# Patient Record
Sex: FEMALE | Race: WHITE | NOT HISPANIC OR LATINO | URBAN - METROPOLITAN AREA
[De-identification: names, ages, dates, MRNs, and addresses within clinical notes are randomized per-mention and may not be internally consistent; named-entity substitution may affect disease eponyms.]

---

## 2020-03-14 ENCOUNTER — APPOINTMENT (OUTPATIENT)
Dept: RADIOLOGY | Facility: MEDICAL CENTER | Age: 60
End: 2020-03-14
Attending: EMERGENCY MEDICINE
Payer: MEDICAID

## 2020-03-14 ENCOUNTER — HOSPITAL ENCOUNTER (OUTPATIENT)
Facility: MEDICAL CENTER | Age: 60
End: 2020-03-15
Attending: EMERGENCY MEDICINE | Admitting: HOSPITALIST
Payer: MEDICAID

## 2020-03-14 DIAGNOSIS — R40.4 ALTERED LEVEL OF CONSCIOUSNESS: ICD-10-CM

## 2020-03-14 PROBLEM — E03.9 ACQUIRED HYPOTHYROIDISM: Status: ACTIVE | Noted: 2019-04-25

## 2020-03-14 LAB
ANION GAP SERPL CALC-SCNC: 9 MMOL/L (ref 7–16)
BASOPHILS # BLD AUTO: 0.3 % (ref 0–1.8)
BASOPHILS # BLD: 0.03 K/UL (ref 0–0.12)
BUN SERPL-MCNC: 30 MG/DL (ref 8–22)
CALCIUM SERPL-MCNC: 9.8 MG/DL (ref 8.5–10.5)
CHLORIDE SERPL-SCNC: 105 MMOL/L (ref 96–112)
CO2 SERPL-SCNC: 25 MMOL/L (ref 20–33)
CREAT SERPL-MCNC: 1.84 MG/DL (ref 0.5–1.4)
EKG IMPRESSION: NORMAL
EOSINOPHIL # BLD AUTO: 0.53 K/UL (ref 0–0.51)
EOSINOPHIL NFR BLD: 5.3 % (ref 0–6.9)
ERYTHROCYTE [DISTWIDTH] IN BLOOD BY AUTOMATED COUNT: 43.3 FL (ref 35.9–50)
GLUCOSE BLD-MCNC: 135 MG/DL (ref 65–99)
GLUCOSE BLD-MCNC: 57 MG/DL (ref 65–99)
GLUCOSE SERPL-MCNC: 59 MG/DL (ref 65–99)
HCT VFR BLD AUTO: 34.9 % (ref 37–47)
HGB BLD-MCNC: 10.8 G/DL (ref 12–16)
IMM GRANULOCYTES # BLD AUTO: 0.05 K/UL (ref 0–0.11)
IMM GRANULOCYTES NFR BLD AUTO: 0.5 % (ref 0–0.9)
LYMPHOCYTES # BLD AUTO: 3.72 K/UL (ref 1–4.8)
LYMPHOCYTES NFR BLD: 36.9 % (ref 22–41)
MCH RBC QN AUTO: 26.3 PG (ref 27–33)
MCHC RBC AUTO-ENTMCNC: 30.9 G/DL (ref 33.6–35)
MCV RBC AUTO: 84.9 FL (ref 81.4–97.8)
MONOCYTES # BLD AUTO: 0.65 K/UL (ref 0–0.85)
MONOCYTES NFR BLD AUTO: 6.4 % (ref 0–13.4)
NEUTROPHILS # BLD AUTO: 5.11 K/UL (ref 2–7.15)
NEUTROPHILS NFR BLD: 50.6 % (ref 44–72)
NRBC # BLD AUTO: 0 K/UL
NRBC BLD-RTO: 0 /100 WBC
PLATELET # BLD AUTO: 510 K/UL (ref 164–446)
PMV BLD AUTO: 10 FL (ref 9–12.9)
POTASSIUM SERPL-SCNC: 4.9 MMOL/L (ref 3.6–5.5)
RBC # BLD AUTO: 4.11 M/UL (ref 4.2–5.4)
SODIUM SERPL-SCNC: 139 MMOL/L (ref 135–145)
WBC # BLD AUTO: 10.1 K/UL (ref 4.8–10.8)

## 2020-03-14 PROCEDURE — 70496 CT ANGIOGRAPHY HEAD: CPT

## 2020-03-14 PROCEDURE — 96374 THER/PROPH/DIAG INJ IV PUSH: CPT | Mod: XU

## 2020-03-14 PROCEDURE — 99220 PR INITIAL OBSERVATION CARE,LEVL III: CPT | Performed by: HOSPITALIST

## 2020-03-14 PROCEDURE — 0042T CT-CEREBRAL PERFUSION ANALYSIS: CPT

## 2020-03-14 PROCEDURE — 96375 TX/PRO/DX INJ NEW DRUG ADDON: CPT

## 2020-03-14 PROCEDURE — G0378 HOSPITAL OBSERVATION PER HR: HCPCS

## 2020-03-14 PROCEDURE — 83036 HEMOGLOBIN GLYCOSYLATED A1C: CPT

## 2020-03-14 PROCEDURE — 700111 HCHG RX REV CODE 636 W/ 250 OVERRIDE (IP): Performed by: EMERGENCY MEDICINE

## 2020-03-14 PROCEDURE — 70498 CT ANGIOGRAPHY NECK: CPT

## 2020-03-14 PROCEDURE — 85025 COMPLETE CBC W/AUTO DIFF WBC: CPT

## 2020-03-14 PROCEDURE — 70450 CT HEAD/BRAIN W/O DYE: CPT

## 2020-03-14 PROCEDURE — 82962 GLUCOSE BLOOD TEST: CPT

## 2020-03-14 PROCEDURE — 700117 HCHG RX CONTRAST REV CODE 255: Performed by: EMERGENCY MEDICINE

## 2020-03-14 PROCEDURE — 93005 ELECTROCARDIOGRAM TRACING: CPT | Performed by: EMERGENCY MEDICINE

## 2020-03-14 PROCEDURE — 700105 HCHG RX REV CODE 258: Performed by: EMERGENCY MEDICINE

## 2020-03-14 PROCEDURE — 99285 EMERGENCY DEPT VISIT HI MDM: CPT

## 2020-03-14 PROCEDURE — 80048 BASIC METABOLIC PNL TOTAL CA: CPT

## 2020-03-14 RX ORDER — ALBUTEROL SULFATE 90 UG/1
2 AEROSOL, METERED RESPIRATORY (INHALATION) EVERY 6 HOURS PRN
COMMUNITY
Start: 2009-08-18

## 2020-03-14 RX ORDER — DEXTROSE AND SODIUM CHLORIDE 5; .45 G/100ML; G/100ML
INJECTION, SOLUTION INTRAVENOUS CONTINUOUS
Status: DISCONTINUED | OUTPATIENT
Start: 2020-03-14 | End: 2020-03-15

## 2020-03-14 RX ORDER — LOSARTAN POTASSIUM 50 MG/1
50 TABLET ORAL DAILY
COMMUNITY
Start: 2019-06-27

## 2020-03-14 RX ORDER — CYCLOBENZAPRINE HCL 10 MG
10 TABLET ORAL
COMMUNITY
Start: 2017-11-10

## 2020-03-14 RX ORDER — AMOXICILLIN 250 MG
2 CAPSULE ORAL 2 TIMES DAILY
Status: DISCONTINUED | OUTPATIENT
Start: 2020-03-14 | End: 2020-03-15 | Stop reason: HOSPADM

## 2020-03-14 RX ORDER — POLYETHYLENE GLYCOL 3350 17 G/17G
1 POWDER, FOR SOLUTION ORAL
Status: DISCONTINUED | OUTPATIENT
Start: 2020-03-14 | End: 2020-03-15 | Stop reason: HOSPADM

## 2020-03-14 RX ORDER — ATORVASTATIN CALCIUM 80 MG/1
80 TABLET, FILM COATED ORAL EVERY EVENING
Status: DISCONTINUED | OUTPATIENT
Start: 2020-03-14 | End: 2020-03-15 | Stop reason: HOSPADM

## 2020-03-14 RX ORDER — INSULIN GLARGINE 100 [IU]/ML
22 INJECTION, SOLUTION SUBCUTANEOUS EVERY EVENING
COMMUNITY
End: 2020-03-14

## 2020-03-14 RX ORDER — PROMETHAZINE HYDROCHLORIDE 25 MG/1
12.5-25 SUPPOSITORY RECTAL EVERY 4 HOURS PRN
Status: DISCONTINUED | OUTPATIENT
Start: 2020-03-14 | End: 2020-03-15 | Stop reason: HOSPADM

## 2020-03-14 RX ORDER — LEVOTHYROXINE SODIUM 0.15 MG/1
150 TABLET ORAL DAILY
COMMUNITY
Start: 2019-09-25

## 2020-03-14 RX ORDER — LEVOTHYROXINE SODIUM 0.15 MG/1
150 TABLET ORAL DAILY
Status: DISCONTINUED | OUTPATIENT
Start: 2020-03-15 | End: 2020-03-15 | Stop reason: HOSPADM

## 2020-03-14 RX ORDER — BISACODYL 10 MG
10 SUPPOSITORY, RECTAL RECTAL
Status: DISCONTINUED | OUTPATIENT
Start: 2020-03-14 | End: 2020-03-15 | Stop reason: HOSPADM

## 2020-03-14 RX ORDER — SODIUM CHLORIDE 9 MG/ML
1000 INJECTION, SOLUTION INTRAVENOUS ONCE
Status: COMPLETED | OUTPATIENT
Start: 2020-03-14 | End: 2020-03-15

## 2020-03-14 RX ORDER — PROMETHAZINE HYDROCHLORIDE 25 MG/1
12.5-25 TABLET ORAL EVERY 4 HOURS PRN
Status: DISCONTINUED | OUTPATIENT
Start: 2020-03-14 | End: 2020-03-15 | Stop reason: HOSPADM

## 2020-03-14 RX ORDER — ASPIRIN 300 MG/1
300 SUPPOSITORY RECTAL DAILY
Status: DISCONTINUED | OUTPATIENT
Start: 2020-03-15 | End: 2020-03-15 | Stop reason: HOSPADM

## 2020-03-14 RX ORDER — ASPIRIN 81 MG/1
324 TABLET, CHEWABLE ORAL DAILY
Status: DISCONTINUED | OUTPATIENT
Start: 2020-03-15 | End: 2020-03-15 | Stop reason: HOSPADM

## 2020-03-14 RX ORDER — ASPIRIN 325 MG
325 TABLET ORAL DAILY
Status: DISCONTINUED | OUTPATIENT
Start: 2020-03-15 | End: 2020-03-15 | Stop reason: HOSPADM

## 2020-03-14 RX ORDER — INSULIN ASPART 100 [IU]/ML
10-15 INJECTION, SOLUTION INTRAVENOUS; SUBCUTANEOUS 3 TIMES DAILY
COMMUNITY
Start: 2017-10-16

## 2020-03-14 RX ORDER — INSULIN GLARGINE 100 [IU]/ML
22 INJECTION, SOLUTION SUBCUTANEOUS
COMMUNITY
Start: 2013-04-05

## 2020-03-14 RX ORDER — ATORVASTATIN CALCIUM 40 MG/1
40 TABLET, FILM COATED ORAL DAILY
Status: ON HOLD | COMMUNITY
Start: 2018-08-30 | End: 2020-03-15

## 2020-03-14 RX ORDER — IBUPROFEN 200 MG
400-800 TABLET ORAL 4 TIMES DAILY
COMMUNITY
Start: 2010-05-03

## 2020-03-14 RX ORDER — GLUCAGON HYDROCHLORIDE 1 MG
1 KIT INJECTION PRN
COMMUNITY
Start: 2019-09-17

## 2020-03-14 RX ORDER — ASPIRIN 81 MG/1
81 TABLET, CHEWABLE ORAL DAILY
COMMUNITY
Start: 2019-07-24 | End: 2020-03-14

## 2020-03-14 RX ORDER — NITROGLYCERIN 0.4 MG/1
0.4 TABLET SUBLINGUAL PRN
COMMUNITY
Start: 2019-07-24

## 2020-03-14 RX ORDER — LISINOPRIL 5 MG/1
5 TABLET ORAL NIGHTLY
COMMUNITY
Start: 2018-08-30 | End: 2020-03-14

## 2020-03-14 RX ORDER — LORAZEPAM 2 MG/ML
1 INJECTION INTRAMUSCULAR ONCE
Status: COMPLETED | OUTPATIENT
Start: 2020-03-14 | End: 2020-03-14

## 2020-03-14 RX ORDER — METOPROLOL SUCCINATE 50 MG/1
25 TABLET, EXTENDED RELEASE ORAL DAILY
COMMUNITY
Start: 2019-07-24

## 2020-03-14 RX ORDER — LABETALOL HYDROCHLORIDE 5 MG/ML
10 INJECTION, SOLUTION INTRAVENOUS EVERY 4 HOURS PRN
Status: DISCONTINUED | OUTPATIENT
Start: 2020-03-14 | End: 2020-03-15 | Stop reason: HOSPADM

## 2020-03-14 RX ORDER — ONDANSETRON 4 MG/1
4 TABLET, ORALLY DISINTEGRATING ORAL EVERY 4 HOURS PRN
Status: DISCONTINUED | OUTPATIENT
Start: 2020-03-14 | End: 2020-03-15 | Stop reason: HOSPADM

## 2020-03-14 RX ORDER — GABAPENTIN 300 MG/1
300-600 CAPSULE ORAL
COMMUNITY
Start: 2017-10-16

## 2020-03-14 RX ORDER — ONDANSETRON 2 MG/ML
4 INJECTION INTRAMUSCULAR; INTRAVENOUS EVERY 4 HOURS PRN
Status: DISCONTINUED | OUTPATIENT
Start: 2020-03-14 | End: 2020-03-15 | Stop reason: HOSPADM

## 2020-03-14 RX ORDER — HYDRALAZINE HYDROCHLORIDE 20 MG/ML
10 INJECTION INTRAMUSCULAR; INTRAVENOUS
Status: DISCONTINUED | OUTPATIENT
Start: 2020-03-14 | End: 2020-03-15 | Stop reason: HOSPADM

## 2020-03-14 RX ORDER — PROCHLORPERAZINE EDISYLATE 5 MG/ML
5-10 INJECTION INTRAMUSCULAR; INTRAVENOUS EVERY 4 HOURS PRN
Status: DISCONTINUED | OUTPATIENT
Start: 2020-03-14 | End: 2020-03-15 | Stop reason: HOSPADM

## 2020-03-14 RX ORDER — GABAPENTIN 300 MG/1
600 CAPSULE ORAL 3 TIMES DAILY
Status: DISCONTINUED | OUTPATIENT
Start: 2020-03-15 | End: 2020-03-15 | Stop reason: HOSPADM

## 2020-03-14 RX ADMIN — IOHEXOL 80 ML: 350 INJECTION, SOLUTION INTRAVENOUS at 21:14

## 2020-03-14 RX ADMIN — SODIUM CHLORIDE 1000 ML: 9 INJECTION, SOLUTION INTRAVENOUS at 21:50

## 2020-03-14 RX ADMIN — SODIUM CHLORIDE 500 MG: 9 INJECTION, SOLUTION INTRAVENOUS at 22:47

## 2020-03-14 RX ADMIN — LORAZEPAM 1 MG: 2 INJECTION INTRAMUSCULAR; INTRAVENOUS at 20:38

## 2020-03-14 RX ADMIN — IOHEXOL 40 ML: 350 INJECTION, SOLUTION INTRAVENOUS at 21:03

## 2020-03-14 ASSESSMENT — ENCOUNTER SYMPTOMS
SPEECH CHANGE: 0
WEAKNESS: 1
FLANK PAIN: 0
ABDOMINAL PAIN: 0
DOUBLE VISION: 0
BLURRED VISION: 1
HEARTBURN: 0
FEVER: 0
SENSORY CHANGE: 1
NAUSEA: 0
DEPRESSION: 0
SHORTNESS OF BREATH: 0
PALPITATIONS: 0
BRUISES/BLEEDS EASILY: 0
MYALGIAS: 0
HEMOPTYSIS: 0
HEADACHES: 1
FOCAL WEAKNESS: 0
HALLUCINATIONS: 0
VOMITING: 0
DIZZINESS: 0
EYE DISCHARGE: 0
COUGH: 0
CHILLS: 0

## 2020-03-14 ASSESSMENT — LIFESTYLE VARIABLES: SUBSTANCE_ABUSE: 0

## 2020-03-15 ENCOUNTER — APPOINTMENT (OUTPATIENT)
Dept: RADIOLOGY | Facility: MEDICAL CENTER | Age: 60
End: 2020-03-15
Attending: HOSPITALIST
Payer: MEDICAID

## 2020-03-15 VITALS
TEMPERATURE: 98.7 F | RESPIRATION RATE: 18 BRPM | WEIGHT: 160 LBS | DIASTOLIC BLOOD PRESSURE: 74 MMHG | BODY MASS INDEX: 28.35 KG/M2 | OXYGEN SATURATION: 98 % | HEIGHT: 63 IN | SYSTOLIC BLOOD PRESSURE: 170 MMHG | HEART RATE: 101 BPM

## 2020-03-15 DIAGNOSIS — I63.81 CEREBROVASCULAR ACCIDENT (CVA) DUE TO OCCLUSION OF SMALL ARTERY (HCC): ICD-10-CM

## 2020-03-15 PROBLEM — R40.4 ALTERED LEVEL OF CONSCIOUSNESS: Status: RESOLVED | Noted: 2020-03-14 | Resolved: 2020-03-15

## 2020-03-15 PROBLEM — I63.9 ACUTE CVA (CEREBROVASCULAR ACCIDENT) (HCC): Status: ACTIVE | Noted: 2020-03-15

## 2020-03-15 LAB
ALBUMIN SERPL BCP-MCNC: 3.4 G/DL (ref 3.2–4.9)
ALBUMIN/GLOB SERPL: 1.1 G/DL
ALP SERPL-CCNC: 86 U/L (ref 30–99)
ALT SERPL-CCNC: 8 U/L (ref 2–50)
ANION GAP SERPL CALC-SCNC: 6 MMOL/L (ref 7–16)
AST SERPL-CCNC: 17 U/L (ref 12–45)
BASOPHILS # BLD AUTO: 0.4 % (ref 0–1.8)
BASOPHILS # BLD: 0.04 K/UL (ref 0–0.12)
BILIRUB SERPL-MCNC: 0.2 MG/DL (ref 0.1–1.5)
BUN SERPL-MCNC: 27 MG/DL (ref 8–22)
CALCIUM SERPL-MCNC: 8.7 MG/DL (ref 8.5–10.5)
CHLORIDE SERPL-SCNC: 104 MMOL/L (ref 96–112)
CHOLEST SERPL-MCNC: 166 MG/DL (ref 100–199)
CO2 SERPL-SCNC: 23 MMOL/L (ref 20–33)
CREAT SERPL-MCNC: 1.78 MG/DL (ref 0.5–1.4)
EOSINOPHIL # BLD AUTO: 0.3 K/UL (ref 0–0.51)
EOSINOPHIL NFR BLD: 3.2 % (ref 0–6.9)
ERYTHROCYTE [DISTWIDTH] IN BLOOD BY AUTOMATED COUNT: 44 FL (ref 35.9–50)
EST. AVERAGE GLUCOSE BLD GHB EST-MCNC: 214 MG/DL
GLOBULIN SER CALC-MCNC: 3.1 G/DL (ref 1.9–3.5)
GLUCOSE BLD-MCNC: 273 MG/DL (ref 65–99)
GLUCOSE BLD-MCNC: 355 MG/DL (ref 65–99)
GLUCOSE SERPL-MCNC: 421 MG/DL (ref 65–99)
HBA1C MFR BLD: 9.1 % (ref 0–5.6)
HCT VFR BLD AUTO: 32.5 % (ref 37–47)
HDLC SERPL-MCNC: 45 MG/DL
HGB BLD-MCNC: 10.3 G/DL (ref 12–16)
IMM GRANULOCYTES # BLD AUTO: 0.02 K/UL (ref 0–0.11)
IMM GRANULOCYTES NFR BLD AUTO: 0.2 % (ref 0–0.9)
LDLC SERPL CALC-MCNC: 75 MG/DL
LYMPHOCYTES # BLD AUTO: 2.84 K/UL (ref 1–4.8)
LYMPHOCYTES NFR BLD: 30.3 % (ref 22–41)
MCH RBC QN AUTO: 27.2 PG (ref 27–33)
MCHC RBC AUTO-ENTMCNC: 31.7 G/DL (ref 33.6–35)
MCV RBC AUTO: 85.8 FL (ref 81.4–97.8)
MONOCYTES # BLD AUTO: 0.5 K/UL (ref 0–0.85)
MONOCYTES NFR BLD AUTO: 5.3 % (ref 0–13.4)
NEUTROPHILS # BLD AUTO: 5.68 K/UL (ref 2–7.15)
NEUTROPHILS NFR BLD: 60.6 % (ref 44–72)
NRBC # BLD AUTO: 0 K/UL
NRBC BLD-RTO: 0 /100 WBC
PLATELET # BLD AUTO: 482 K/UL (ref 164–446)
PMV BLD AUTO: 10.8 FL (ref 9–12.9)
POTASSIUM SERPL-SCNC: 5.3 MMOL/L (ref 3.6–5.5)
PROT SERPL-MCNC: 6.5 G/DL (ref 6–8.2)
RBC # BLD AUTO: 3.79 M/UL (ref 4.2–5.4)
SODIUM SERPL-SCNC: 133 MMOL/L (ref 135–145)
TRIGL SERPL-MCNC: 231 MG/DL (ref 0–149)
WBC # BLD AUTO: 9.4 K/UL (ref 4.8–10.8)

## 2020-03-15 PROCEDURE — 700102 HCHG RX REV CODE 250 W/ 637 OVERRIDE(OP): Performed by: HOSPITALIST

## 2020-03-15 PROCEDURE — 96372 THER/PROPH/DIAG INJ SC/IM: CPT

## 2020-03-15 PROCEDURE — 82962 GLUCOSE BLOOD TEST: CPT | Mod: 91

## 2020-03-15 PROCEDURE — 85025 COMPLETE CBC W/AUTO DIFF WBC: CPT

## 2020-03-15 PROCEDURE — 99245 OFF/OP CONSLTJ NEW/EST HI 55: CPT | Performed by: PSYCHIATRY & NEUROLOGY

## 2020-03-15 PROCEDURE — 70551 MRI BRAIN STEM W/O DYE: CPT

## 2020-03-15 PROCEDURE — 80053 COMPREHEN METABOLIC PANEL: CPT

## 2020-03-15 PROCEDURE — 36415 COLL VENOUS BLD VENIPUNCTURE: CPT

## 2020-03-15 PROCEDURE — 80061 LIPID PANEL: CPT

## 2020-03-15 PROCEDURE — G0378 HOSPITAL OBSERVATION PER HR: HCPCS

## 2020-03-15 PROCEDURE — 700105 HCHG RX REV CODE 258: Performed by: HOSPITALIST

## 2020-03-15 PROCEDURE — A9270 NON-COVERED ITEM OR SERVICE: HCPCS | Performed by: HOSPITALIST

## 2020-03-15 PROCEDURE — 99217 PR OBSERVATION CARE DISCHARGE: CPT | Mod: GC | Performed by: INTERNAL MEDICINE

## 2020-03-15 RX ORDER — METOPROLOL SUCCINATE 25 MG/1
25 TABLET, EXTENDED RELEASE ORAL DAILY
Status: DISCONTINUED | OUTPATIENT
Start: 2020-03-15 | End: 2020-03-15 | Stop reason: HOSPADM

## 2020-03-15 RX ORDER — ATORVASTATIN CALCIUM 80 MG/1
80 TABLET, FILM COATED ORAL EVERY EVENING
Qty: 30 TAB | Refills: 0 | Status: SHIPPED | OUTPATIENT
Start: 2020-03-15

## 2020-03-15 RX ORDER — ASPIRIN 325 MG
325 TABLET ORAL DAILY
Qty: 30 TAB | Refills: 0 | Status: SHIPPED | OUTPATIENT
Start: 2020-03-16

## 2020-03-15 RX ADMIN — GABAPENTIN 600 MG: 300 CAPSULE ORAL at 08:54

## 2020-03-15 RX ADMIN — INSULIN HUMAN 3 UNITS: 100 INJECTION, SOLUTION PARENTERAL at 09:02

## 2020-03-15 RX ADMIN — ATORVASTATIN CALCIUM 80 MG: 80 TABLET, FILM COATED ORAL at 01:27

## 2020-03-15 RX ADMIN — INSULIN HUMAN 5 UNITS: 100 INJECTION, SOLUTION PARENTERAL at 03:19

## 2020-03-15 RX ADMIN — DEXTROSE AND SODIUM CHLORIDE: 5; 450 INJECTION, SOLUTION INTRAVENOUS at 00:23

## 2020-03-15 RX ADMIN — ASPIRIN 325 MG: 325 TABLET, FILM COATED ORAL at 08:54

## 2020-03-15 RX ADMIN — LEVOTHYROXINE SODIUM 150 MCG: 150 TABLET ORAL at 08:54

## 2020-03-15 ASSESSMENT — LIFESTYLE VARIABLES
HOW MANY TIMES IN THE PAST YEAR HAVE YOU HAD 5 OR MORE DRINKS IN A DAY: 0
AVERAGE NUMBER OF DAYS PER WEEK YOU HAVE A DRINK CONTAINING ALCOHOL: 0
CONSUMPTION TOTAL: NEGATIVE
HAVE YOU EVER FELT YOU SHOULD CUT DOWN ON YOUR DRINKING: NO
ALCOHOL_USE: NO
HAVE PEOPLE ANNOYED YOU BY CRITICIZING YOUR DRINKING: NO
ON A TYPICAL DAY WHEN YOU DRINK ALCOHOL HOW MANY DRINKS DO YOU HAVE: 0
EVER_SMOKED: NEVER
TOTAL SCORE: 0
TOTAL SCORE: 0
EVER FELT BAD OR GUILTY ABOUT YOUR DRINKING: NO
EVER HAD A DRINK FIRST THING IN THE MORNING TO STEADY YOUR NERVES TO GET RID OF A HANGOVER: NO
TOTAL SCORE: 0

## 2020-03-15 NOTE — PROGRESS NOTES
Patient back from MRI. Patient completed transfer from [facility] to bed fully independent and with full use of upper and lower limb extremities.

## 2020-03-15 NOTE — PROGRESS NOTES
Labs drawn and sent to lab. fsbs checked per pt demand, pt adamant , getting agitated and started to yell at staff, fs check fs 355, wants her insulin at this time.

## 2020-03-15 NOTE — PROGRESS NOTES
IV removed. Discharge instructions provided and patient verbalizes understanding. Patient states that all question have been answered. Copy of discharge provided to patient and signed. Prescription: script provided. Patient states that all personal items are in possess. Patient escorted off unit by wheelchair.  Follow-up appointments discussed with patient.

## 2020-03-15 NOTE — CARE PLAN
Problem: Safety:  Goal: Will remain free from injury  Outcome: PROGRESSING AS EXPECTED  Goal: Risk of aspiration will decrease  Outcome: PROGRESSING AS EXPECTED     Problem: Communication  Goal: The ability to communicate needs accurately and effectively will improve  Outcome: PROGRESSING AS EXPECTED

## 2020-03-15 NOTE — ED NOTES
Report to KIMBERLEY Huerta, CDU.     Pt to floor with transport.       Patients Father is in room 208 at the [facility]

## 2020-03-15 NOTE — ED NOTES
RN attempted to ambulate patient to bathroom. She was having difficulty with balance. She was leaning to right and had difficulty maintaining an upright position. She is equally weak on both sides but is have some ataxia. Dr. Quick notified.

## 2020-03-15 NOTE — PROGRESS NOTES
Pt refusing to take her am meds at this time, want RN to leave it at bedside so she can take when she is ready, pt informed  It is not allowed to leave meds at bedside.

## 2020-03-15 NOTE — H&P
Hospital Medicine History & Physical Note    Date of Service  3/14/2020    Primary Care Physician  No primary care provider on file.    Consultants  none    Code Status  full    Chief Complaint  aloc     History of Presenting Illness  59 y.o. female who presented 3/14/2020 with past medical history of diabetes, depression, asthma, hypertension, previous TIA presents with altered level of consciousness.  This patient she actually drove over from [place].  She was in her routine state of health and was out to dinner.  She had a sudden onset of looking off into space and became altered.  Apparently she had slumped down in her chair.  And had some jerking type movement.  The patient was confused and does not remember the event when she awoke.  She does have some mild visual changes and changes in her gait.  She said initially she was having some slurred speech as well.  Otherwise no known alleviating or exacerbating factors to her symptoms.  She will be admitted to the hospital for further observation to rule out CVA and possible seizures.    Review of Systems  Review of Systems   Constitutional: Positive for malaise/fatigue. Negative for chills and fever.   HENT: Negative for congestion, hearing loss and tinnitus.    Eyes: Positive for blurred vision. Negative for double vision and discharge.   Respiratory: Negative for cough, hemoptysis and shortness of breath.    Cardiovascular: Negative for chest pain, palpitations and leg swelling.   Gastrointestinal: Negative for abdominal pain, heartburn, nausea and vomiting.   Genitourinary: Negative for dysuria and flank pain.   Musculoskeletal: Negative for joint pain and myalgias.   Skin: Negative for rash.   Neurological: Positive for sensory change, weakness and headaches. Negative for dizziness, speech change and focal weakness.   Endo/Heme/Allergies: Negative for environmental allergies. Does not bruise/bleed easily.   Psychiatric/Behavioral: Negative for depression,  hallucinations and substance abuse.       Past Medical History   has a past medical history of Asthma, Depression, Diabetes (HCC), Hypertension, Murmur, and TIA (transient ischemic attack).    Surgical History   has a past surgical history that includes shoulder arthroscopy (Right); primary c section; and nerve ulnar repair or explore.     Family History  Reviewed and not pertinent     Social History   reports that she has quit smoking. She has never used smokeless tobacco. She reports previous alcohol use. She reports previous drug use.    Allergies  No Known Allergies    Medications  Prior to Admission Medications   Prescriptions Last Dose Informant Patient Reported? Taking?   NOVOLOG, insulin aspart, (NOVOLOG FLEXPEN) 100 UNIT/ML injection PEN   Yes Yes   Sig: Inject 40 Units as instructed every day. 3 gram per carb of meals.   albuterol 108 (90 Base) MCG/ACT Aero Soln inhalation aerosol   Yes Yes   Sig: Inhale 2 Puffs by mouth every 6 hours as needed.   aspirin (ASA) 81 MG Chew Tab chewable tablet   Yes Yes   Sig: Spray 81 mg in mouth/throat every day.   atorvastatin (LIPITOR) 40 MG Tab   Yes Yes   Sig: Take 40 mg by mouth every day.   cyclobenzaprine (FLEXERIL) 10 MG Tab   Yes Yes   Sig: Take 10 mg by mouth as needed.   gabapentin (NEURONTIN) 300 MG Cap   Yes Yes   Sig: Take 600 mg by mouth 3 times a day.   glucagon (GLUCAGEN HYPOKIT) 1 MG Recon Soln   Yes Yes   Si Kit by Intramuscular route as needed.   ibuprofen (MOTRIN) 800 MG Tab   Yes Yes   Sig: Take 800 mg by mouth every 8 hours as needed.   insulin glargine (INSULIN GLARGINE) 100 UNIT/ML Solution Pen-injector injection   Yes Yes   Sig: Inject 22 Units as instructed every bedtime.   insulin glargine (LANTUS) 100 UNIT/ML Solution 3/13/2020 at Unknown time  Yes Yes   Sig: Inject 22 Units as instructed every evening. Indications: Insulin-Dependent Diabetes   levothyroxine (SYNTHROID) 150 MCG Tab   Yes Yes   Sig: Take 150 mcg by mouth every day.    lisinopril (PRINIVIL) 5 MG Tab   Yes Yes   Sig: Take 5 mg by mouth every evening.   losartan (COZAAR) 50 MG Tab   Yes Yes   Sig: Take 50 mg by mouth every day.   metoprolol SR (TOPROL XL) 50 MG TABLET SR 24 HR   Yes Yes   Sig: Take 25 mg by mouth every day.   nitroglycerin (NITROSTAT) 0.4 MG SL Tab   Yes Yes   Sig: Place 0.4 mg under tongue as needed for Chest Pain.      Facility-Administered Medications: None       Physical Exam  Pulse:  [82-87] 87  Resp:  [16-20] 20  BP: (135-158)/(51-63) 158/55  SpO2:  [83 %-100 %] 100 %    Physical Exam  Vitals signs reviewed.   Constitutional:       Appearance: Normal appearance.   HENT:      Head: Normocephalic and atraumatic.      Nose: No congestion.      Mouth/Throat:      Mouth: Mucous membranes are dry.   Eyes:      Extraocular Movements: Extraocular movements intact.      Pupils: Pupils are equal, round, and reactive to light.   Neck:      Musculoskeletal: Normal range of motion and neck supple. No muscular tenderness.   Cardiovascular:      Rate and Rhythm: Normal rate and regular rhythm.      Pulses: Normal pulses.      Heart sounds: Normal heart sounds. No murmur.   Pulmonary:      Effort: Pulmonary effort is normal. No respiratory distress.      Breath sounds: No stridor.   Abdominal:      General: Bowel sounds are normal. There is no distension.      Palpations: Abdomen is soft.      Tenderness: There is no abdominal tenderness.   Musculoskeletal:         General: No swelling or deformity.   Skin:     General: Skin is warm and dry.      Capillary Refill: Capillary refill takes 2 to 3 seconds.   Neurological:      General: No focal deficit present.      Mental Status: She is alert and oriented to person, place, and time.   Psychiatric:         Mood and Affect: Mood normal.         Behavior: Behavior normal.         Thought Content: Thought content normal.         Judgment: Judgment normal.         Laboratory:  Recent Labs     03/14/20 1945   WBC 10.1   RBC 4.11*    HEMOGLOBIN 10.8*   HEMATOCRIT 34.9*   MCV 84.9   MCH 26.3*   MCHC 30.9*   RDW 43.3   PLATELETCT 510*   MPV 10.0     Recent Labs     03/14/20 1945   SODIUM 139   POTASSIUM 4.9   CHLORIDE 105   CO2 25   GLUCOSE 59*   BUN 30*   CREATININE 1.84*   CALCIUM 9.8     Recent Labs     03/14/20 1945   GLUCOSE 59*         No results for input(s): NTPROBNP in the last 72 hours.      No results for input(s): TROPONINT in the last 72 hours.    Urinalysis:    No results found     Imaging:  CT-CTA NECK WITH & W/O-POST PROCESSING   Final Result      Bilateral proximal internal carotid artery plaque, left greater than right. Less than 50% diameter stenosis.      3 mm noncalcified right upper lobe pulmonary nodule. Small pulmonary nodules are an extremely common finding on CT, and even in smokers, the vast majority of these nodules are benign.  For this reason, we recommend managing such nodules with follow-up    CT.      Single solid nodules less than 6 mm do not require routine follow-up, but certain patients at high-risk with suspicious nodule morphology, upper lobe location, or both may warrant 12 month CT follow-up.      Fleischner Society 2017 Guidelines for Management of Incidentally Detected Pulmonary Nodules on CT Images      CT-CTA HEAD WITH & W/O-POST PROCESS   Final Result      No hemodynamically significant stenosis of the intracranial arteries.      Age-indeterminate left thalamic lacunar infarct.      CT-CEREBRAL PERFUSION ANALYSIS   Final Result      1.  Cerebral blood flow less than 30% likely representing completed infarct = 0 mL.      2.  T Max more than 6 seconds likely representing combination of completed infarct and ischemia = 0 mL.      3.  Mismatched volume likely representing ischemic brain/penumbra = None      4.  Please note that the cerebral perfusion was performed on the limited brain tissue around the basal ganglia region. Infarct/ischemia outside the CT perfusion sections can be missed in this study.       CT-HEAD W/O   Final Result      1.  Mild cerebral atrophy and chronic microvascular ischemic type changes.   2.  Age-indeterminate small left thalamic lacunar infarct.      MR-BRAIN-W/O    (Results Pending)         Assessment/Plan:  I anticipate this patient is appropriate for observation status at this time.    * Altered level of consciousness  Assessment & Plan  Unclear etiology   Patient with transient loss of conciousness, blurry vision, postictal confusion vs changes in speech, abnormal gait   Suspect could be possible TIA, seizure vs hypoglycemia  Will check MRI brain w/o, eeg   Stroke protocol ordered  Treat underlying hypoglycemia with D5 1/2 NS and serial accu checks   Neuro checks       Type 1 diabetes mellitus (HCC)- (present on admission)  Assessment & Plan  Seems as if she has tight control with associated hypoglycemia now   Con with D5 1/2 ns and SSI with accu checks     GERD (gastroesophageal reflux disease)- (present on admission)  Assessment & Plan  Controlled  Cont to monitor    Hypertension- (present on admission)  Assessment & Plan  Allowing for permissive htn for now       Hyperlipidemia- (present on admission)  Assessment & Plan  High dose statin therapy   Lipid panel    Acquired hypothyroidism- (present on admission)  Assessment & Plan  Resume home levothyroxine     Asthma- (present on admission)  Assessment & Plan  resp care protocol   Not in acute exacerbation       VTE prophylaxis: scd

## 2020-03-15 NOTE — ED PROVIDER NOTES
ED Provider Note    CHIEF COMPLAINT  Chief Complaint   Patient presents with   • ALOC       HPI  Ting Douglas is a 59 y.o. female who presents with altered level of consciousness.  Patient was in her routine state of health and she was out to dinner with her father tonight.  Her father stated that all sudden she began looking off into space and became altered and then had some tonic-clonic type movement.  The patient was somewhat confused but awoke in the ambulance unsure as exactly to what it happened.  Patient denied headache chest pain abdominal pain.  She has not been sick with cough or cold symptoms.    REVIEW OF SYSTEMS  See HPI for further details.  No fever no chills.  No cough or cold symptoms.  No vomiting or diarrhea.  All other systems are negative.    PAST MEDICAL HISTORY  Past Medical History:   Diagnosis Date   • Asthma    • Depression    • Diabetes (HCC)    • Hypertension    • Murmur    • TIA (transient ischemic attack)        FAMILY HISTORY  History reviewed. No pertinent family history.    SOCIAL HISTORY  Social History     Socioeconomic History   • Marital status: Not on file     Spouse name: Not on file   • Number of children: Not on file   • Years of education: Not on file   • Highest education level: Not on file   Occupational History   • Not on file   Social Needs   • Financial resource strain: Not on file   • Food insecurity     Worry: Not on file     Inability: Not on file   • Transportation needs     Medical: Not on file     Non-medical: Not on file   Tobacco Use   • Smoking status: Former Smoker   • Smokeless tobacco: Never Used   Substance and Sexual Activity   • Alcohol use: Not Currently   • Drug use: Not Currently   • Sexual activity: Not on file   Lifestyle   • Physical activity     Days per week: Not on file     Minutes per session: Not on file   • Stress: Not on file   Relationships   • Social connections     Talks on phone: Not on file     Gets together: Not on file     Attends  "Buddhist service: Not on file     Active member of club or organization: Not on file     Attends meetings of clubs or organizations: Not on file     Relationship status: Not on file   • Intimate partner violence     Fear of current or ex partner: Not on file     Emotionally abused: Not on file     Physically abused: Not on file     Forced sexual activity: Not on file   Other Topics Concern   • Not on file   Social History Narrative   • Not on file       SURGICAL HISTORY  Past Surgical History:   Procedure Laterality Date   • NERVE ULNAR REPAIR OR EXPLORE     • PRIMARY C SECTION     • SHOULDER ARTHROSCOPY Right        CURRENT MEDICATIONS  Home Medications     Reviewed by Yu David R.N. (Registered Nurse) on 03/14/20 at [facility]  Med List Status: Unable to Obtain   Medication Last Dose Status        Patient Garrett Taking any Medications                       ALLERGIES  No Known Allergies    PHYSICAL EXAM  VITAL SIGNS: /63   Pulse 86   Resp 20   Ht 1.6 m (5' 3\")   Wt 72.6 kg (160 lb)   SpO2 95%   BMI 28.34 kg/m²   Constitutional: Well developed, Well nourished, No acute distress, Non-toxic appearance.   HENT: Normocephalic atraumatic.  The oropharynx is moist.  Nares are normal.  Eyes: Pupils are equal reactive to light.  Extraocular movements are intact  Neck: Normal range of motion, No tenderness, Supple, No stridor.   Cardiovascular: Normal heart rate, Normal rhythm, No murmurs, No rubs, No gallops.   Thorax & Lungs: Normal breath sounds, No respiratory distress, No wheezing, No chest tenderness.   Abdomen: Bowel sounds normal, Soft, No tenderness, No masses, No pulsatile masses.   Skin: Warm, Dry, No erythema, No rash.   Back: No tenderness, No CVA tenderness.   Extremities: No edema, No tenderness, No cyanosis, No clubbing. Dorsalis pedis pulses 2+ equal bilaterally. Radial pulses 2+ equal bilaterally   Neurologic: Normal deep tendon reflexes.  Strength sensation intact.  Psychiatric: Affect " normal, Judgment normal, Mood normal.     EKG  Sinus rhythm at a rate of 80.  Normal P waves.  Normal QRS.  Normal R wave progression.  Normal EKG    RADIOLOGY/PROCEDURES  CT-CTA NECK WITH & W/O-POST PROCESSING   Final Result      Bilateral proximal internal carotid artery plaque, left greater than right. Less than 50% diameter stenosis.      3 mm noncalcified right upper lobe pulmonary nodule. Small pulmonary nodules are an extremely common finding on CT, and even in smokers, the vast majority of these nodules are benign.  For this reason, we recommend managing such nodules with follow-up    CT.      Single solid nodules less than 6 mm do not require routine follow-up, but certain patients at high-risk with suspicious nodule morphology, upper lobe location, or both may warrant 12 month CT follow-up.      Fleischner Society 2017 Guidelines for Management of Incidentally Detected Pulmonary Nodules on CT Images      CT-CTA HEAD WITH & W/O-POST PROCESS   Final Result      No hemodynamically significant stenosis of the intracranial arteries.      Age-indeterminate left thalamic lacunar infarct.      CT-CEREBRAL PERFUSION ANALYSIS   Final Result      1.  Cerebral blood flow less than 30% likely representing completed infarct = 0 mL.      2.  T Max more than 6 seconds likely representing combination of completed infarct and ischemia = 0 mL.      3.  Mismatched volume likely representing ischemic brain/penumbra = None      4.  Please note that the cerebral perfusion was performed on the limited brain tissue around the basal ganglia region. Infarct/ischemia outside the CT perfusion sections can be missed in this study.      CT-HEAD W/O   Final Result      1.  Mild cerebral atrophy and chronic microvascular ischemic type changes.   2.  Age-indeterminate small left thalamic lacunar infarct.      MR-BRAIN-W/O    (Results Pending)         COURSE & MEDICAL DECISION MAKING  Pertinent Labs & Imaging studies reviewed. (See chart  for details)  Initially I felt the patient may have simply been having a absence seizure that may have been generalized while she was out to dinner however the patient was unable to follow my finger when testing her extraocular movements.  Patient intermittently has been ataxic here and now complaining of difficulty with her vision.  CT scan did not show any signs of large vessel disease however am concerned about posterior circulation stroke and/or seizure.  Patient will be started on Keppra and admitted to the hospital service.  Patient will get an MRI and a neurology consult.    FINAL IMPRESSION  1.  Altered mental status  2.  Absent seizures  3.  Rule out posterior circulation stroke          Electronically signed by: Tom Quick M.D., 3/14/2020 8:06 PM

## 2020-03-15 NOTE — PROGRESS NOTES
Patient completed transfer from bed to [facility] fully independent and with full use of upper and lower limb extremities. Tech present for transferred.

## 2020-03-15 NOTE — DISCHARGE PLANNING
[facility] Transitional Care Coordination     Referral from:  Dr. Terrell    Facesheet indicates: Misc.    Potential Rehab Diagnosis: CVA    Chart review indicates patient may have on going medical management and may have therapy needs to possibly meet inpatient rehab facility criteria with the goal of returning to community.    D/C support: Father     Physiatry consultation pended per protocol.      CVA. W/U & TX evals are pending. Waiting on additional information to determine appropriateness for acute inpatient rehabilitation. Will continue to follow.     Thank you for the referral.

## 2020-03-15 NOTE — CONSULTS
Neurology STROKE H&P  Neurohospitalist Service, [facility]    Referring Physician: Nima Stevens M.D.    STROKE CODE:   Chief Complaint   Patient presents with   • ALOC       HPI: Ting Douglas is a 59 y.o. woman with history of DM and HTN presenting to the hospital 3/14/20 for AMS.  Neurology consulted for infarction as imaged on brain MRI.  The patient was in [place], out to dinner, when she became acutely altered and slumped down in her chair.  Says that her speech was altered.  However denies weakness, numbness, no new changes in vision.  On my evaluation at bedside today, patient denies headache and is asking to be discharged.    Review of systems: In addition to what is detailed in the HPI above, (and scanned into the chart if and when applicable), all other systems reviewed and are negative.    Past Medical History:    has a past medical history of Asthma, Depression, Diabetes (HCC), Hypertension, Murmur, and TIA (transient ischemic attack).    FHx:   Hypertension, diabetes    SHx:   reports that she has quit smoking. She has never used smokeless tobacco. She reports previous alcohol use. She reports previous drug use.    Allergies:  No Known Allergies    Medications:    Current Facility-Administered Medications:   •  levETIRAcetam (KEPPRA) 500 mg in  mL IVPB, 500 mg, Intravenous, Q12HRS, Tom Quick M.D., Stopped at 03/14/20 2302  •  Pharmacy consult request - Allow for permissive hypertension: SBP up to 220 mmHg/DBP up to 120 mmHg x 48 hours, , Other, PHARMACY TO DOSE, Jadiel Terrell M.D.  •  labetalol (NORMODYNE/TRANDATE) injection 10 mg, 10 mg, Intravenous, Q4HRS PRN **OR** hydrALAZINE (APRESOLINE) injection 10 mg, 10 mg, Intravenous, Q2HRS PRN, Jadiel Terrell M.D.  •  atorvastatin (LIPITOR) tablet 80 mg, 80 mg, Oral, Q EVENING, Jadiel Terrell M.D., 80 mg at 03/15/20 0127  •  aspirin (ASA) tablet 325 mg, 325 mg, Oral, DAILY, 325 mg at 03/15/20 0854 **OR**  aspirin (ASA) chewable tab 324 mg, 324 mg, Oral, DAILY **OR** aspirin (ASA) suppository 300 mg, 300 mg, Rectal, DAILY, Jadiel Terrell M.D.  •  senna-docusate (PERICOLACE or SENOKOT S) 8.6-50 MG per tablet 2 Tab, 2 Tab, Oral, BID, Stopped at 03/14/20 2230 **AND** polyethylene glycol/lytes (MIRALAX) PACKET 1 Packet, 1 Packet, Oral, QDAY PRN **AND** magnesium hydroxide (MILK OF MAGNESIA) suspension 30 mL, 30 mL, Oral, QDAY PRN **AND** bisacodyl (DULCOLAX) suppository 10 mg, 10 mg, Rectal, QDAY PRN, Jadiel Terrell M.D.  •  D5 1/2 NS infusion, , Intravenous, Continuous, Jadiel Terrell M.D., Last Rate: 75 mL/hr at 03/15/20 0735  •  ondansetron (ZOFRAN) syringe/vial injection 4 mg, 4 mg, Intravenous, Q4HRS PRN, Jadiel Terrell M.D.  •  ondansetron (ZOFRAN ODT) dispertab 4 mg, 4 mg, Oral, Q4HRS PRN, Jadiel Terrell M.D.  •  promethazine (PHENERGAN) tablet 12.5-25 mg, 12.5-25 mg, Oral, Q4HRS PRN, Jadiel Terrell M.D.  •  promethazine (PHENERGAN) suppository 12.5-25 mg, 12.5-25 mg, Rectal, Q4HRS PRN, Jadiel Terrell M.D.  •  prochlorperazine (COMPAZINE) injection 5-10 mg, 5-10 mg, Intravenous, Q4HRS PRN, Jadiel Terrell M.D.  •  insulin regular (HUMULIN R) injection 1-6 Units, 1-6 Units, Subcutaneous, Q6HRS, 3 Units at 03/15/20 0902 **AND** Accu-Chek Q6 if NPO, , , Q6H **AND** NOTIFY MD and PharmD, , , Once **AND** glucose 4 g chewable tablet 16 g, 16 g, Oral, Q15 MIN PRN **AND** DEXTROSE 10% BOLUS 250 mL, 250 mL, Intravenous, Q15 MIN PRN, Jadiel Terrell M.D.  •  albuterol (PROVENTIL) 2.5mg/0.5ml nebulizer solution 2.5 mg, 2.5 mg, Inhalation, Q6HRS PRN, Jadiel Terrell M.D.  •  gabapentin (NEURONTIN) capsule 600 mg, 600 mg, Oral, TID, Jadiel Terrell M.D., 600 mg at 03/15/20 0854  •  levothyroxine (SYNTHROID) tablet 150 mcg, 150 mcg, Oral, DAILY, Jadiel Terrell M.D., 150 mcg at 03/15/20 0854  •  Respiratory Therapy Consult, , Nebulization, Continuous RT, Jadiel Terrell M.D.    Physical  Examination:    Vitals:    03/14/20 2318 03/15/20 0129 03/15/20 0410 03/15/20 0848   BP: 142/72 (!) 176/71 (!) 161/70 (!) 170/74   Pulse: 84  74 (!) 101   Resp: 16 18 18 18   Temp: 36.2 °C (97.1 °F)  36.3 °C (97.3 °F) 37.1 °C (98.7 °F)   TempSrc: Temporal  Temporal Temporal   SpO2: 97% 100% 93% 98%   Weight:       Height:           General: Patient is awake and in no acute distress  Eyes: examination of optic disks not indicated at this time  CV: RRR    NEUROLOGICAL EXAM:     Mental status: Awake, alert oriented to month but not to place, follows simple commands  Speech and language: speech is mildly dysarthric. The patient is able to name and repeat.  Cranial nerve exam: Pupils are equal, round and reactive to light bilaterally.  Patchy distribution of loss of vision, decreased peripheral vision, no overt field cut. Extraocular muscles are intact with jerky pursuit. Sensation in the face is intact to light touch. Face is symmetric. Hearing to finger rub equal. Palate elevates symmetrically. Shoulder shrug is full. Tongue is midline.  Motor exam: Strength is 5/5 in all extremities both distally and proximally.  There is no pronator drift or orbiting.  Tone is normal. No abnormal movements were seen on exam.  Sensory exam: Decreased sensation in a stocking glove distribution, decreased proprioception distally  Deep tendon reflexes:  1+ and symmetric.  Toes upgoing on the right and downgoing on the left  Coordination: no ataxia   Gait: deferred due to patient refusal    NIH Stroke Scale:    1a. Level of Consciousness (Alert, drowsy, etc): 0= Alert    1b. LOC Questions (Month, age): 0= Answers both correctly    1c. LOC Commands (Open/close eyes make fist/let go): 0= Obeys both correctly    2.   Best Gaze (Eyes open - patient follows examiner's finger on face): 0= Normal    3.   Visual Fields (introduce visual stimulus/threat to patient's field quadrants): 1= Partial Hemiania  4.   Facial Paresis (Show teeth, raise  eyebrows and squeeze eyes shut): 0= Normal     5a. Motor Arm - Left (Elevate arm to 90 degrees if patient is sitting, 45 degrees if  supine): 0= No drift    5b. Motor Arm - Right (Elevate arm to 90 degrees if patient is sitting, 45 degrees if supine): 0= No drift    6a. Motor Leg - Left (Elevate leg 30 degrees with patient supine): 0= No drift    6b. Motor Leg - Right  (Elevate leg 30 degrees with patient supine): 0= No drift    7.   Limb Ataxia (Finger-nose, heel down shin): 0= No ataxia    8.   Sensory (Pin prick to face, arm, trunk and leg - compare side to side): 1= Partial loss    9.  Best Language (Name item, describe a picture and read sentences): 0= No aphasia    10. Dysarthria (Evaluate speech clarity by patient repeating listed words): 1= Mild to moderate slurring    11. Extinction and Inattention (Use information from prior testing to identify neglect or  double simultaneous stimuli testing): 0= No neglect    Total NIH Score: 3    Objective Data:    Labs:  No results found for: PROTHROMBTM, INR   Lab Results   Component Value Date/Time    WBC 9.4 03/15/2020 03:15 AM    RBC 3.79 (L) 03/15/2020 03:15 AM    HEMOGLOBIN 10.3 (L) 03/15/2020 03:15 AM    HEMATOCRIT 32.5 (L) 03/15/2020 03:15 AM    MCV 85.8 03/15/2020 03:15 AM    MCH 27.2 03/15/2020 03:15 AM    MCHC 31.7 (L) 03/15/2020 03:15 AM    MPV 10.8 03/15/2020 03:15 AM    NEUTSPOLYS 60.60 03/15/2020 03:15 AM    LYMPHOCYTES 30.30 03/15/2020 03:15 AM    MONOCYTES 5.30 03/15/2020 03:15 AM    EOSINOPHILS 3.20 03/15/2020 03:15 AM    BASOPHILS 0.40 03/15/2020 03:15 AM      Lab Results   Component Value Date/Time    SODIUM 133 (L) 03/15/2020 03:15 AM    POTASSIUM 5.3 03/15/2020 03:15 AM    CHLORIDE 104 03/15/2020 03:15 AM    CO2 23 03/15/2020 03:15 AM    GLUCOSE 421 (H) 03/15/2020 03:15 AM    BUN 27 (H) 03/15/2020 03:15 AM    CREATININE 1.78 (H) 03/15/2020 03:15 AM      Lab Results   Component Value Date/Time    CHOLSTRLTOT 166 03/15/2020 03:15 AM    LDL 75  03/15/2020 03:15 AM    HDL 45 03/15/2020 03:15 AM    TRIGLYCERIDE 231 (H) 03/15/2020 03:15 AM       Lab Results   Component Value Date/Time    ALKPHOSPHAT 86 03/15/2020 03:15 AM    ASTSGOT 17 03/15/2020 03:15 AM    ALTSGPT 8 03/15/2020 03:15 AM    TBILIRUBIN 0.2 03/15/2020 03:15 AM        Imaging/Testing:    I interpreted and/or reviewed the patient's neuroimaging    MR-BRAIN-W/O   Final Result      1.  Acute lacunar infarct in the left parietal subcortical white matter.   2.  Mild chronic microvascular ischemic disease.   3.  Mild cerebral volume loss.      CT-CTA NECK WITH & W/O-POST PROCESSING   Final Result      Bilateral proximal internal carotid artery plaque, left greater than right. Less than 50% diameter stenosis.      3 mm noncalcified right upper lobe pulmonary nodule. Small pulmonary nodules are an extremely common finding on CT, and even in smokers, the vast majority of these nodules are benign.  For this reason, we recommend managing such nodules with follow-up    CT.      Single solid nodules less than 6 mm do not require routine follow-up, but certain patients at high-risk with suspicious nodule morphology, upper lobe location, or both may warrant 12 month CT follow-up.      Fleischner Society 2017 Guidelines for Management of Incidentally Detected Pulmonary Nodules on CT Images      CT-CTA HEAD WITH & W/O-POST PROCESS   Final Result      No hemodynamically significant stenosis of the intracranial arteries.      Age-indeterminate left thalamic lacunar infarct.      CT-CEREBRAL PERFUSION ANALYSIS   Final Result      1.  Cerebral blood flow less than 30% likely representing completed infarct = 0 mL.      2.  T Max more than 6 seconds likely representing combination of completed infarct and ischemia = 0 mL.      3.  Mismatched volume likely representing ischemic brain/penumbra = None      4.  Please note that the cerebral perfusion was performed on the limited brain tissue around the basal ganglia  region. Infarct/ischemia outside the CT perfusion sections can be missed in this study.      CT-HEAD W/O   Final Result      1.  Mild cerebral atrophy and chronic microvascular ischemic type changes.   2.  Age-indeterminate small left thalamic lacunar infarct.          Assessment and Plan:    Ting Douglas is a 59 y.o. female with relevant history of uncontrolled diabetes and hypertension presenting for whom neurology was consulted to address a left-sided lacunar infarct.  Most likely etiology is small vessel disease given location and size of infarct, and history.  Patient is out of the window for any acute stroke intervention.    Plan:    - provide secondary stroke prevention measures  - Neurology checks and vital signs per protocol  - goal BP <140 systolic. Long term BP goal (s/p 1-2 weeks from onset) is normotension  - obtain normoglycemia and avoid hypo- or hyper -natremia; aim for normothermia  - secondary stroke prevention therapy with ASA 325mg qd   - high intensity statin per SPARCL Trial  - evaluate and treat with PT/OT/ST  - referral placed for stroke bridge clinic.    The evaluation of the patient, and recommended management, was discussed with Dr. Nima Stevens.    Devon Red MD  Director, Comprehensive Stroke Center, [facility]  Neurohospitalist, [facility]  Clinical  of Neurology, [facility] [employer]  t) [phone] (f) [phone]

## 2020-03-15 NOTE — ASSESSMENT & PLAN NOTE
Unclear etiology   Patient with transient loss of conciousness, blurry vision, postictal confusion vs changes in speech, abnormal gait   Suspect could be possible TIA, seizure vs hypoglycemia  Will check MRI brain w/o, eeg   Stroke protocol ordered  Treat underlying hypoglycemia with D5 1/2 NS and serial accu checks   Neuro checks

## 2020-03-15 NOTE — ED TRIAGE NOTES
"Patient arrived via EMS from a restaurant with compaints of a period of confusion and altered LOC. Father called 911 because \"sehw as acting like her sugar was low.\" EMS reported FSBS 217. She was found to be oriented x0 and making incomprehensive sounds. With a few minutes of EMS evaluation Pt became suddenly alert and oriented x2. She reports no memory of incident and is having difficulty with date and time.     Pts father did reports some body shaking during the period that patient was altered. She has no hx of seizure.     Pt educated on ED process and asked to wait in lobby. Patient educated on importance of alerting staff to new or worsening symptoms or concerns.   "

## 2020-03-15 NOTE — DISCHARGE INSTRUCTIONS
Discharge Instructions    Discharged to home by car with relative. Discharged via wheelchair, hospital escort: Yes.  Special equipment needed: Not Applicable    Be sure to schedule a follow-up appointment with your primary care doctor or any specialists as instructed.     Discharge Plan:   Diet Plan: Discussed  Activity Level: Discussed  Confirmed Follow up Appointment: Patient to Call and Schedule Appointment  Confirmed Symptoms Management: Discussed  Medication Reconciliation Updated: Yes    I understand that a diet low in cholesterol, fat, and sodium is recommended for good health. Unless I have been given specific instructions below for another diet, I accept this instruction as my diet prescription.   Other diet: heart healthy diet    Special Instructions: None    · Is patient discharged on Warfarin / Coumadin?   No       Ischemic Stroke  An ischemic stroke (cerebrovascular accident, or CVA) is the sudden death of brain tissue that occurs when an area of the brain does not get enough oxygen. It is a medical emergency that must be treated right away. An ischemic stroke can cause permanent loss of brain function. This can cause problems with how different parts of your body function.  What are the causes?  This condition is caused by a decrease of oxygen supply to an area of the brain, which may be the result of:  · A small blood clot (embolus) or a buildup of plaque in the blood vessels (atherosclerosis) that blocks blood flow in the brain.  · An abnormal heart rhythm (atrial fibrillation).  · A blocked or damaged artery in the head or neck.  What increases the risk?  Certain factors may make you more likely to develop this condition. Some of these factors are things that you can change, such as:  · Obesity.  · Smoking cigarettes.  · Taking oral birth control, especially if you also use tobacco.  · Physical inactivity.  · Excessive alcohol use.  · Use of illegal drugs, especially cocaine and  methamphetamine.  Other risk factors include:  · High blood pressure (hypertension).  · High cholesterol.  · Diabetes mellitus.  · Heart disease.  · Being , , , or .  · Being over age 60.  · Family history of stroke.  · Previous history of blood clots, stroke, or transient ischemic attack (TIA).  · Sickle cell disease.  · Being a woman with a history of preeclampsia.  · Migraine headache.  · Sleep apnea.  · Irregular heartbeats, such as atrial fibrillation.  · Chronic inflammatory diseases, such as rheumatoid arthritis or lupus.  · Blood clotting disorders (hypercoagulable state).  What are the signs or symptoms?  Symptoms of this condition usually develop suddenly, or you may notice them after waking up from sleep. Symptoms may include sudden:  · Weakness or numbness in your face, arm, or leg, especially on one side of your body.  · Trouble walking or difficulty moving your arms or legs.  · Loss of balance or coordination.  · Confusion.  · Slurred speech (dysarthria).  · Trouble speaking, understanding speech, or both (aphasia).  · Vision changes--such as double vision, blurred vision, or loss of vision--in one or both eyes.  · Dizziness.  · Nausea and vomiting.  · Severe headache with no known cause. The headache is often described as the worst headache ever experienced.  If possible, make note of the exact time that you last felt like your normal self and what time your symptoms started. Tell your health care provider.  If symptoms come and go, this could be a sign of a warning stroke, or TIA. Get help right away, even if you feel better.  How is this diagnosed?  This condition may be diagnosed based on:  · Your symptoms, your medical history, and a physical exam.  · CT scan of the brain.  · MRI.  · CT angiogram. This test uses a computer to take X-rays of your arteries. A dye may be injected into your blood to show the inside of your blood vessels more  clearly.  · MRI angiogram. This is a type of MRI that is used to evaluate the blood vessels.  · Cerebral angiogram. This test uses X-rays and a dye to show the blood vessels in the brain and neck.  You may need to see a health care provider who specializes in stroke care. A stroke specialist can be seen in person or through communication using telephone or television technology (telemedicine).  Other tests may also be done to find the cause of the stroke, such as:  · Electrocardiogram (ECG).  · Continuous heart monitoring.  · Echocardiogram.  · Carotid ultrasound.  · A scan of the brain circulation.  · Blood tests.  · Sleep study to check for sleep apnea.  How is this treated?  Treatment for this condition will depend on the duration, severity, and cause of your symptoms and on the area of the brain affected. It is very important to get treatment at the first sign of stroke symptoms. Some treatments work better if they are done within 3-6 hours of the onset of stroke symptoms. These initial treatments may include:  · Aspirin.  · Medicines to control blood pressure.  · Medicine given by injection to dissolve the blood clot (thrombolytic).  · Treatments given directly to the affected artery to remove or dissolve the blood clot.  Other treatment options may include:  · Oxygen.  · IV fluids.  · Medicines to thin the blood (anticoagulants or antiplatelets).  · Procedures to increase blood flow.  Medicines and changes to your diet may be used to help treat and manage risk factors for stroke, such as diabetes, high cholesterol, and high blood pressure.  After a stroke, you may work with physical, speech, mental health, or occupational therapists to help you recover.  Follow these instructions at home:  Medicines  · Take over-the-counter and prescription medicines only as told by your health care provider.  · If you were told to take a medicine to thin your blood, such as aspirin or an anticoagulant, take it exactly as told  by your health care provider.  ¨ Taking too much blood-thinning medicine can cause bleeding.  ¨ If you do not take enough blood-thinning medicine, you will not have the protection that you need against another stroke and other problems.  · Understand the side effects of taking anticoagulant medicine. When taking this type of medicine, make sure you:  ¨ Hold pressure over any cuts for longer than usual.  ¨ Tell your dentist and other health care providers that you are taking anticoagulants before you have any procedures that may cause bleeding.  ¨ Avoid activities that may cause trauma or injury.  Eating and drinking  · Follow instructions from your health care provider about diet.  · Eat healthy foods.  · If your ability to swallow was affected by the stroke, you may need to take steps to avoid choking, such as:  ¨ Taking small bites when eating.  ¨ Eating foods that are soft or pureed.  Safety  · Follow instructions from your health care team about physical activity.  · Use a walker or cane as told by your health care provider.  · Take steps to create a safe home environment in order to reduce the risk of falls. This may include:  ¨ Having your home looked at by specialists.  ¨ Installing grab bars in the bedroom and bathroom.  ¨ Using safety equipment, such as raised toilets and a seat in the shower.  General instructions  · Do not use any tobacco products, such as cigarettes, chewing tobacco, and e-cigarettes. If you need help quitting, ask your health care provider.  · Limit alcohol intake to no more than 1 drink a day for nonpregnant women and 2 drinks a day for men. One drink equals 12 oz of beer, 5 oz of wine, or 1½ oz of hard liquor.  · If you need help to stop using drugs or alcohol, ask your health care provider about a referral to a program or specialist.  · Maintain an active and healthy lifestyle. Get regular exercise as told by your health care provider.  · Keep all follow-up visits as told by your  health care provider, including visits with all specialists on your health care team. This is important.  How is this prevented?  Your risk of another stroke can be decreased by managing high blood pressure, high cholesterol, diabetes, heart disease, sleep apnea, and obesity. It can also be decreased by quitting smoking, limiting alcohol, and staying physically active.  Your health care provider will continue to work with you on measures to prevent short-term and long-term complications of stroke.  Get help right away if:  You have:  · Sudden weakness or numbness in your face, arm, or leg, especially on one side of your body.  · Sudden confusion.  · Sudden trouble speaking, understanding, or both (aphasia).  · Sudden trouble seeing with one or both eyes.  · Sudden trouble walking or difficulty moving your arms or legs.  · Sudden dizziness.  · Sudden loss of balance or coordination.  · Sudden, severe headache with no known cause.  · A partial or total loss of consciousness.  · A seizure.  Any of these symptoms may represent a serious problem that is an emergency. Do not wait to see if the symptoms will go away. Get medical help right away. Call your local emergency services (911 in [place] Do not drive yourself to the hospital.   This information is not intended to replace advice given to you by your health care provider. Make sure you discuss any questions you have with your health care provider.  Document Released: 12/18/2006 Document Revised: 05/30/2017 Document Reviewed: 03/15/2017  [employer] Interactive Patient Education © 2017 [employer]      Depression / Suicide Risk    As you are discharged from this [facility] facility, it is important to learn how to keep safe from harming yourself.    Recognize the warning signs:  · Abrupt changes in personality, positive or negative- including increase in energy   · Giving away possessions  · Change in eating patterns- significant weight changes-  positive or  negative  · Change in sleeping patterns- unable to sleep or sleeping all the time   · Unwillingness or inability to communicate  · Depression  · Unusual sadness, discouragement and loneliness  · Talk of wanting to die  · Neglect of personal appearance   · Rebelliousness- reckless behavior  · Withdrawal from people/activities they love  · Confusion- inability to concentrate     If you or a loved one observes any of these behaviors or has concerns about self-harm, here's what you can do:  · Talk about it- your feelings and reasons for harming yourself  · Remove any means that you might use to hurt yourself (examples: pills, rope, extension cords, firearm)  · Get professional help from the community (Mental Health, Substance Abuse, psychological counseling)  · Do not be alone:Call your Safe Contact- someone whom you trust who will be there for you.  · Call your local CRISIS HOTLINE 209-3217 or 673-027-1035  · Call your local Children's Mobile Crisis Response Team [place] (409) 080-9842 or www.[facility]  · Call the toll free National Suicide Prevention Hotlines   · National Suicide Prevention Lifeline 219-549-ULAJ (7961)  · National Hope Line Network 800-SUICIDE (318-6979)

## 2020-03-15 NOTE — ASSESSMENT & PLAN NOTE
Seems as if she has tight control with associated hypoglycemia now   Con with D5 1/2 ns and SSI with accu checks

## 2020-03-15 NOTE — DISCHARGE SUMMARY
Discharge Summary    CHIEF COMPLAINT ON ADMISSION  Chief Complaint   Patient presents with   • ALOC       Reason for Admission  EMS     Admission Date  3/14/2020    CODE STATUS  Full Code    HPI & HOSPITAL COURSE  This is a 59 y.o. female here with PMHx DM, depression, HTN, TIA, and asthma. She presented with c/o altered level of consciousness, slurred speech and jerking movements. She was admitted to the CDU for stroke work-up and observation. She was noted to be hypoglycemia, thus hypoglycemia protocol was initiated. Neurology was consulted for acute lacunar infarct on brain MRI. High intensity statin and ASA recommended.     The following morning, patient reported feeling back to baseline, and eager to be discharged home. Creatinine was improving on labs. Still with elevated BP as home BP meds were held for permissive HTN per stroke protocol. I requested patient resume her BP meds here so we could get her BP down as well as ensure she was not overcorrected in terms of antihypertensives, but she refused. For this reason, patient was discharged with high intensity statin and ASA and instructions to follow-up with neurology back home in [place].  Reports she was already established with PCP and I instructed her to follow-up with him in 1 week.        Therefore, she is discharged in guarded and stable condition to home with close outpatient follow-up.    The patient recovered much more quickly than anticipated on admission.    Discharge Date  03/15/20    FOLLOW UP ITEMS POST DISCHARGE  None.    DISCHARGE DIAGNOSES  Principal Problem (Resolved):    Altered level of consciousness POA: Unknown  Active Problems:    Asthma POA: Yes    Acquired hypothyroidism POA: Yes      Overview: Dx mid 20's      T4 dose increase 125->150 by PCP 9/19            Last Assessment & Plan:       Primary hypothyroidism recently under replaced with elevated TSH.        Levothyroxine dose was increased 9/19.  The patient has not been taking        the medication on an empty stomach and we reviewed measures to ensure       adequate absorption today.      We discussed that both over and under replacement of her thyroid levels       can affect her glycemic control.    Hyperlipidemia POA: Yes    Hypertension POA: Yes    GERD (gastroesophageal reflux disease) POA: Yes    Type 1 diabetes mellitus (HCC) POA: Yes      Overview: Dx age 9, insulin since dx      Chronically poor control      Multiple hospitalizations for DKA      Hypoglycemia unawareness            Last Assessment & Plan:       Formatting of this note might be different from the original.      Type 1 diabetes which is severely and chronically uncontrolled.      Patient has had multiple hospitalizations recently for DKA and this has       been issue for many years.      She also has hypoglycemia unawareness with multiple serious hypoglycemic       episodes requiring outside assistance.      Review of her previous endocrinology records indicate repeated       noncompliance and anger management issues.  However, she appeared to be       motivated to improve control and open to doing the groundwork necessary to       get insurance coverage for a continuous glucose sensor.      I do not believe she would be a good candidate for an insulin pump because       she does not know how to calculate carbohydrate content of meals and until       we can demonstrate very reliable compliance, she would be at even higher       risk for DKA on a pump.  This occurs very quickly when there are pump       issues such as occlusion of insulin delivery.            For now, I suggested blood sugar targets between 150 and 250 in an effort       to avoid serious hypoglycemia and severe hyperglycemia.            -Continue Basaglar 20 units in the morning      -Reviewed NovoLog dose calculation guidelines as below      -Encouraged to ask the nutritionist to work on carbohydrate counting       education with her            -Strongly  "encouraged to check her blood sugar 4 times a day for the next       30 days as supportive documentation for coverage for a glucose sensor            A Dexcom G6 device would be the best option for her as this alarms for       hyper and hypoglycemia.  The G6 version does not need to be calibrated       with fingerstick values.            Patient instructions as below            Continue the higher dose of levothyroxine 150 mcg daily for now      BUT Please take your levothyroxine (brand names include Synthroid and       Levoxyl) first thing in the morning on an empty stomach.        To ensure optimal absorption, it is necessary to      -Wait 30 minutes before having breakfast or coffee.        -Wait 2 hours before consuming soy products such as soy milk, or       supplements containing iron or calcium (including TUMs).      Try to avoid switching between different manufacturers of your thyroid       replacement medication.            Continue Basglar 20 units in the morning            Take Novolog 15 mins before breakfast, lunch and dinner            1.  Take 1 unit for every 15 g of carbs      * Ask freddy to teach you \"Carb counting\"      2.  Adjust up or down according to your pre meal sugar: 1 unit to loer       your glucose by 33 93 units for 100) to target of 150 during the day, 250       if near bedtime      3.  If active in the next 1 hr, take 2 units less            Please check your blood sugar at home See downloaded data and 4 times a       day;Pre breakfast/Fasting, Bedtime, Pre lunch and Pre dinner      Please bring your glucometer and/or log of home blood sugars to your next       appointment.      Target home blood sugars are Pre breakfast/Fasting  and Bedtime <200      If having low blood sugars less than 70, please permanently decrease dose       of Basaglar insulin in 1-2 unit steps            Target Hemoglobin A1c (indicator of blood sugar control for the previous 3       months)  is <7.5%. "       Your last Hemoglobin A1c:      Hemoglobin A1C       Date Value Ref Range Status       06/12/2018 11.9 (A) 4.2 - 6.5 % Final       03/27/2018 12 (H) <5.7 % Final         Comment:         Hgb A1C Interpretive Information:                  <5.7% - Normal         5.7-6.4% - Consistent with pre-diabetes            >6.4% - Consistent with diabetes       03/27/2018 12.0 (H) <5.7 % Final         Comment:         Hgb A1C Interpretive Information:                  <5.7% - Normal         5.7-6.4% - Consistent with pre-diabetes            >6.4% - Consistent with diabetes             Ongoing endocrinology/diabetes care will be with our specialized nurse       practitioner Mitzy Middleton NP.             We need to show 4 BG checks a day x 30 days to get insurance coverage for       a glucose sensor.      The best option for you would be a DEXCOM      ICD10    Acute CVA (cerebrovascular accident) (HCC) POA: Unknown      FOLLOW UP  No future appointments.  PCP    Schedule an appointment as soon as possible for a visit  for follow-up with       MEDICATIONS ON DISCHARGE     Medication List      START taking these medications      Instructions   aspirin 325 MG Tabs  Start taking on:  March 16, 2020  Commonly known as:  ASA  Replaces:  aspirin EC 81 MG Tbec   Take 1 Tab by mouth every day.  Dose:  325 mg        CHANGE how you take these medications      Instructions   atorvastatin 80 MG tablet  What changed:    · medication strength  · how much to take  · when to take this  Commonly known as:  LIPITOR   Take 1 Tab by mouth every evening.  Dose:  80 mg        CONTINUE taking these medications      Instructions   albuterol 108 (90 Base) MCG/ACT Aers inhalation aerosol   Inhale 2 Puffs by mouth every 6 hours as needed.  Dose:  2 Puff     cyclobenzaprine 10 MG Tabs  Commonly known as:  FLEXERIL   Take 10 mg by mouth 1 time daily as needed for Moderate Pain.  Dose:  10 mg     gabapentin 300 MG Caps  Commonly known as:  NEURONTIN    Take 300-600 mg by mouth 5 Times a Day. Take 600mg in AM  Take 300mg Midday  Take 600mg at Noon  Take 300mg at PM  Take 600mg at HS.  Dose:  300-600 mg     GlucaGen HypoKit 1 MG Solr  Generic drug:  glucagon   1 Kit by Intramuscular route as needed.  Dose:  1 Kit     ibuprofen 200 MG Tabs  Commonly known as:  MOTRIN   Take 400-800 mg by mouth 4 times a day. 800mg in AM  400mg Midday  400mg at PM  800mg at HS.  Scheduled.  Dose:  400-800 mg     insulin glargine 100 UNIT/ML Sopn injection  Generic drug:  insulin glargine   Inject 22 Units as instructed every bedtime.  Dose:  22 Units     levothyroxine 150 MCG Tabs  Commonly known as:  SYNTHROID   Take 150 mcg by mouth every day.  Dose:  150 mcg     losartan 50 MG Tabs  Commonly known as:  COZAAR   Take 50 mg by mouth every day.  Dose:  50 mg     metoprolol SR 50 MG Tb24  Commonly known as:  TOPROL XL   Take 25 mg by mouth every day.  Dose:  25 mg     nitroglycerin 0.4 MG Subl  Commonly known as:  NITROSTAT   Place 0.4 mg under tongue as needed for Chest Pain.  Dose:  0.4 mg     NovoLOG FlexPen 100 UNIT/ML injection PEN  Generic drug:  NOVOLOG (insulin aspart)   Inject 10-15 Units as instructed 3 times a day. 1 unit = 3g Carbohydrates.  Dose:  10-15 Units        STOP taking these medications    aspirin EC 81 MG Tbec  Commonly known as:  ECOTRIN  Replaced by:  aspirin 325 MG Tabs            Allergies  No Known Allergies    DIET  Orders Placed This Encounter   Procedures   • Diet Order Diabetic     Standing Status:   Standing     Number of Occurrences:   1     Order Specific Question:   Diet:     Answer:   Diabetic [3]       ACTIVITY  As tolerated.  Weight bearing as tolerated    CONSULTATIONS  Neurology    PROCEDURES  None     LABORATORY  Lab Results   Component Value Date    SODIUM 133 (L) 03/15/2020    POTASSIUM 5.3 03/15/2020    CHLORIDE 104 03/15/2020    CO2 23 03/15/2020    GLUCOSE 421 (H) 03/15/2020    BUN 27 (H) 03/15/2020    CREATININE 1.78 (H) 03/15/2020         Lab Results   Component Value Date    WBC 9.4 03/15/2020    HEMOGLOBIN 10.3 (L) 03/15/2020    HEMATOCRIT 32.5 (L) 03/15/2020    PLATELETCT 482 (H) 03/15/2020

## 2020-03-15 NOTE — PROGRESS NOTES
Pt admit from ED, via [facility], a/o 1-2,sedated and  Confused, unable to follow command at times,assessment completed,poc discussed,bed alarm on for safety,able to take po without any difficulty, pt expresses concern for her father, emotional,assisted up to bsc, pt noted to be tipping on her left side,pt also keeps falling asleep while sitting on the commode,voided, call button within reach,will continue to monitor.

## 2020-03-15 NOTE — PROGRESS NOTES
Patient informed of discharge. Patient informed of metoprolol order. Patient refused to wait for medication, patient request to be DC and refused med